# Patient Record
Sex: FEMALE | Race: WHITE | NOT HISPANIC OR LATINO | Employment: UNEMPLOYED | URBAN - NONMETROPOLITAN AREA
[De-identification: names, ages, dates, MRNs, and addresses within clinical notes are randomized per-mention and may not be internally consistent; named-entity substitution may affect disease eponyms.]

---

## 2018-11-26 ENCOUNTER — OFFICE VISIT (OUTPATIENT)
Dept: RETAIL CLINIC | Facility: CLINIC | Age: 73
End: 2018-11-26

## 2018-11-26 VITALS
OXYGEN SATURATION: 98 % | HEART RATE: 72 BPM | TEMPERATURE: 97.2 F | BODY MASS INDEX: 32.17 KG/M2 | HEIGHT: 66 IN | RESPIRATION RATE: 18 BRPM | WEIGHT: 200.2 LBS

## 2018-11-26 DIAGNOSIS — J01.00 SUBACUTE MAXILLARY SINUSITIS: Primary | ICD-10-CM

## 2018-11-26 PROCEDURE — 99203 OFFICE O/P NEW LOW 30 MIN: CPT | Performed by: NURSE PRACTITIONER

## 2018-11-26 RX ORDER — BUSPIRONE HYDROCHLORIDE 10 MG/1
10 TABLET ORAL 3 TIMES DAILY
COMMUNITY

## 2018-11-26 RX ORDER — CITALOPRAM 40 MG/1
40 TABLET ORAL DAILY
COMMUNITY

## 2018-11-26 RX ORDER — GABAPENTIN 300 MG/1
300 CAPSULE ORAL 3 TIMES DAILY PRN
COMMUNITY

## 2018-11-26 RX ORDER — FLUTICASONE PROPIONATE 50 MCG
2 SPRAY, SUSPENSION (ML) NASAL DAILY
Qty: 1 BOTTLE | Refills: 0 | Status: SHIPPED | OUTPATIENT
Start: 2018-11-26

## 2018-11-26 RX ORDER — LISINOPRIL AND HYDROCHLOROTHIAZIDE 25; 20 MG/1; MG/1
1 TABLET ORAL DAILY
COMMUNITY

## 2018-11-26 RX ORDER — ROPINIROLE 0.5 MG/1
0.5 TABLET, FILM COATED ORAL NIGHTLY
COMMUNITY

## 2018-11-26 RX ORDER — AMOXICILLIN AND CLAVULANATE POTASSIUM 875; 125 MG/1; MG/1
1 TABLET, FILM COATED ORAL 2 TIMES DAILY
Qty: 20 TABLET | Refills: 0 | Status: SHIPPED | OUTPATIENT
Start: 2018-11-26 | End: 2018-12-06

## 2018-11-26 RX ORDER — OMEPRAZOLE 20 MG/1
20 CAPSULE, DELAYED RELEASE ORAL DAILY
COMMUNITY

## 2018-11-26 RX ORDER — PRAVASTATIN SODIUM 40 MG
40 TABLET ORAL DAILY
COMMUNITY

## 2018-11-26 NOTE — PROGRESS NOTES
SHANELLE Reyes is a 73 y.o. female.   Chief Complaint   Patient presents with   • Sinusitis      Sinusitis   This is a recurrent problem. The current episode started in the past 7 days. The problem has been rapidly worsening since onset. There has been no fever. Associated symptoms include chills, congestion, coughing (nonproductive), ear pain (left ear congestion), headaches, sinus pressure and a sore throat. Pertinent negatives include no neck pain, shortness of breath, sneezing or swollen glands. Past treatments include nothing.      The following portions of the patient's history were reviewed and updated as appropriate: allergies, current medications, past family history, past medical history, past social history, past surgical history and problem list.    Current Outpatient Medications:   •  busPIRone (BUSPAR) 10 MG tablet, Take 10 mg by mouth 3 (Three) Times a Day., Disp: , Rfl:   •  citalopram (CeleXA) 40 MG tablet, Take 40 mg by mouth Daily., Disp: , Rfl:   •  gabapentin (NEURONTIN) 300 MG capsule, Take 300 mg by mouth 3 (Three) Times a Day As Needed., Disp: , Rfl:   •  lisinopril-hydrochlorothiazide (PRINZIDE,ZESTORETIC) 20-25 MG per tablet, Take 1 tablet by mouth Daily., Disp: , Rfl:   •  omeprazole (priLOSEC) 20 MG capsule, Take 20 mg by mouth Daily., Disp: , Rfl:   •  pravastatin (PRAVACHOL) 40 MG tablet, Take 40 mg by mouth Daily., Disp: , Rfl:   •  rOPINIRole (REQUIP) 0.5 MG tablet, Take 0.5 mg by mouth Every Night. Take 1 hour before bedtime., Disp: , Rfl:   •  amoxicillin-clavulanate (AUGMENTIN) 875-125 MG per tablet, Take 1 tablet by mouth 2 (Two) Times a Day for 10 days., Disp: 20 tablet, Rfl: 0  •  fluticasone (FLONASE) 50 MCG/ACT nasal spray, 2 sprays into the nostril(s) as directed by provider Daily., Disp: 1 bottle, Rfl: 0    No Known Allergies    Review of Systems   Constitutional: Positive for activity change and chills. Negative for fever.   HENT: Positive for congestion,  "ear pain (left ear congestion), sinus pressure and sore throat. Negative for sneezing.    Eyes: Negative for discharge and itching.   Respiratory: Positive for cough (nonproductive). Negative for shortness of breath and wheezing.    Cardiovascular: Negative for chest pain.   Gastrointestinal: Negative for abdominal distention, diarrhea, nausea and vomiting.   Musculoskeletal: Positive for myalgias. Negative for neck pain and neck stiffness.   Skin: Negative for color change, pallor and rash.   Allergic/Immunologic: Negative for immunocompromised state.   Neurological: Positive for headaches.   Psychiatric/Behavioral: Negative for sleep disturbance.     Objective     Visit Vitals  Pulse 72   Temp 97.2 °F (36.2 °C) (Temporal)   Resp 18   Ht 167.6 cm (66\")   Wt 90.8 kg (200 lb 3.2 oz)   SpO2 98%   BMI 32.31 kg/m²     Physical Exam   Constitutional: She is oriented to person, place, and time. She appears well-developed and well-nourished.   HENT:   Head: Normocephalic.   Right Ear: Tympanic membrane is bulging. Tympanic membrane is not erythematous.   Left Ear: Tympanic membrane is injected and bulging. Tympanic membrane is not perforated and not erythematous. A middle ear effusion is present.   Nose: Mucosal edema, rhinorrhea and sinus tenderness present. Right sinus exhibits maxillary sinus tenderness. Left sinus exhibits maxillary sinus tenderness.   Mouth/Throat: Posterior oropharyngeal erythema present. No posterior oropharyngeal edema.   Eyes: Conjunctivae are normal. Pupils are equal, round, and reactive to light.   Cardiovascular: Normal rate and regular rhythm.   Pulmonary/Chest: Effort normal and breath sounds normal. She has no wheezes.   Abdominal: Soft. Bowel sounds are normal. She exhibits no distension. There is no tenderness. There is no guarding.   Lymphadenopathy:     She has cervical adenopathy.   Neurological: She is alert and oriented to person, place, and time.   Skin: Skin is warm and dry. "   Psychiatric: She has a normal mood and affect. Her behavior is normal.     Lab Results (last 24 hours)     ** No results found for the last 24 hours. **        Assessment/Plan   Beulah was seen today for sinusitis.    Diagnoses and all orders for this visit:    Subacute maxillary sinusitis  -     fluticasone (FLONASE) 50 MCG/ACT nasal spray; 2 sprays into the nostril(s) as directed by provider Daily.  -     amoxicillin-clavulanate (AUGMENTIN) 875-125 MG per tablet; Take 1 tablet by mouth 2 (Two) Times a Day for 10 days.               Discussed PE findings and treatment plan. AVS reviewed with patient, understanding verbalized and agrees with treatment plan.  Follow up with primary care provider if no improvement within next several days. Go to [facility] or ER if condition worsens.

## 2018-11-26 NOTE — PATIENT INSTRUCTIONS

## 2019-05-18 ENCOUNTER — OFFICE VISIT (OUTPATIENT)
Dept: RETAIL CLINIC | Facility: CLINIC | Age: 74
End: 2019-05-18

## 2019-05-18 VITALS
DIASTOLIC BLOOD PRESSURE: 70 MMHG | HEIGHT: 66 IN | WEIGHT: 186 LBS | RESPIRATION RATE: 16 BRPM | OXYGEN SATURATION: 98 % | BODY MASS INDEX: 29.89 KG/M2 | TEMPERATURE: 98.7 F | SYSTOLIC BLOOD PRESSURE: 120 MMHG

## 2019-05-18 DIAGNOSIS — B34.9 VIRAL SYNDROME: Primary | ICD-10-CM

## 2019-05-18 PROCEDURE — 99213 OFFICE O/P EST LOW 20 MIN: CPT | Performed by: NURSE PRACTITIONER

## 2019-05-18 NOTE — PROGRESS NOTES
Subjective   Beulah Reyes is a 73 y.o. female.   Chief Complaint   Patient presents with   • Fatigue      Pt presents to the clinic today for evaluation.  Complains of not feeling well for the last several days.  No fever but has been chilling.  No cough, rhinorrhea or sore throat.  She feels tired and weak.  No specific urinary symptoms.  No chest discomfort or respiratory problems.  No specific ill contacts.  No recent tick bites.  No recent travel.        Fatigue   This is a new problem. The current episode started in the past 7 days. Associated symptoms include chills, fatigue, a visual change and weakness. Pertinent negatives include no abdominal pain, chest pain, congestion, coughing, fever, headaches, joint swelling, myalgias, nausea, rash, sore throat, urinary symptoms or vomiting.        The following portions of the patient's history were reviewed and updated as appropriate: allergies, current medications, past family history, past medical history, past social history, past surgical history and problem list.    Current Outpatient Medications:   •  busPIRone (BUSPAR) 10 MG tablet, Take 10 mg by mouth 3 (Three) Times a Day., Disp: , Rfl:   •  citalopram (CeleXA) 40 MG tablet, Take 40 mg by mouth Daily., Disp: , Rfl:   •  fluticasone (FLONASE) 50 MCG/ACT nasal spray, 2 sprays into the nostril(s) as directed by provider Daily., Disp: 1 bottle, Rfl: 0  •  gabapentin (NEURONTIN) 300 MG capsule, Take 300 mg by mouth 3 (Three) Times a Day As Needed., Disp: , Rfl:   •  lisinopril-hydrochlorothiazide (PRINZIDE,ZESTORETIC) 20-25 MG per tablet, Take 1 tablet by mouth Daily., Disp: , Rfl:   •  omeprazole (priLOSEC) 20 MG capsule, Take 20 mg by mouth Daily., Disp: , Rfl:   •  pravastatin (PRAVACHOL) 40 MG tablet, Take 40 mg by mouth Daily., Disp: , Rfl:   •  rOPINIRole (REQUIP) 0.5 MG tablet, Take 0.5 mg by mouth Every Night. Take 1 hour before bedtime., Disp: , Rfl:     Review of Systems   Constitutional: Positive  "for chills and fatigue. Negative for activity change, appetite change and fever.   HENT: Negative for congestion, drooling, ear discharge, ear pain, mouth sores, nosebleeds, postnasal drip, rhinorrhea, sneezing and sore throat.    Eyes: Negative for pain, discharge and visual disturbance.   Respiratory: Negative for cough, shortness of breath and wheezing.    Cardiovascular: Negative for chest pain, palpitations and leg swelling.   Gastrointestinal: Negative for abdominal pain, constipation, diarrhea, nausea and vomiting.   Musculoskeletal: Negative for joint swelling and myalgias.   Skin: Negative for rash and wound.   Neurological: Positive for weakness. Negative for headaches.     /70 (BP Location: Left arm, Patient Position: Sitting)   Temp 98.7 °F (37.1 °C) (Temporal)   Resp 16   Ht 167.6 cm (66\")   Wt 84.4 kg (186 lb)   SpO2 98%   BMI 30.02 kg/m²     Objective   No Known Allergies    Physical Exam   Constitutional: Vital signs are normal. She appears well-developed and well-nourished.   HENT:   Head: Normocephalic.   Right Ear: Tympanic membrane normal.   Left Ear: Tympanic membrane normal.   Nose: Nose normal.   Mouth/Throat: Uvula is midline, oropharynx is clear and moist and mucous membranes are normal.   Eyes: Conjunctivae and EOM are normal. Pupils are equal, round, and reactive to light.   Neck: Normal range of motion. Neck supple. No thyromegaly present.   Cardiovascular: Normal rate and regular rhythm.   Pulmonary/Chest: Effort normal and breath sounds normal.   Abdominal: Soft. Normal appearance and bowel sounds are normal. She exhibits no mass. There is no hepatosplenomegaly. There is no tenderness.   Musculoskeletal: Normal range of motion.   Neurological: She is alert.   Skin: Skin is warm and dry.   Psychiatric: She has a normal mood and affect. Her speech is normal and behavior is normal.       Assessment/Plan   Beulah was seen today for fatigue.    Diagnoses and all orders for this " visit:    Viral syndrome      Discussed with patient that there is nothing specific on her exam to treat today.  Encourage her to do a urinalysis however she wasn't able to urinate and didn't want to wait and give a sample.  I recommended that she go for further evaluation to include laboratory testing and imaging.  Discussed differentials and advised pt to seek further medical attention.  She agrees and expresses understanding.

## 2019-05-18 NOTE — PATIENT INSTRUCTIONS
Viral Illness, Adult  Viruses are tiny germs that can get into a person's body and cause illness. There are many different types of viruses, and they cause many types of illness. Viral illnesses can range from mild to severe. They can affect various parts of the body.  Common illnesses that are caused by a virus include colds and the flu. Viral illnesses also include serious conditions such as HIV/AIDS (human immunodeficiency virus/acquired immunodeficiency syndrome). A few viruses have been linked to certain cancers.  What are the causes?  Many types of viruses can cause illness. Viruses invade cells in your body, multiply, and cause the infected cells to malfunction or die. When the cell dies, it releases more of the virus. When this happens, you develop symptoms of the illness, and the virus continues to spread to other cells. If the virus takes over the function of the cell, it can cause the cell to divide and grow out of control, as is the case when a virus causes cancer.  Different viruses get into the body in different ways. You can get a virus by:  · Swallowing food or water that is contaminated with the virus.  · Breathing in droplets that have been coughed or sneezed into the air by an infected person.  · Touching a surface that has been contaminated with the virus and then touching your eyes, nose, or mouth.  · Being bitten by an insect or animal that carries the virus.  · Having sexual contact with a person who is infected with the virus.  · Being exposed to blood or fluids that contain the virus, either through an open cut or during a transfusion.    If a virus enters your body, your body's defense system (immune system) will try to fight the virus. You may be at higher risk for a viral illness if your immune system is weak.  What are the signs or symptoms?  Symptoms vary depending on the type of virus and the location of the cells that it invades. Common symptoms of the main types of viral illnesses  include:  Cold and flu viruses  · Fever.  · Headache.  · Sore throat.  · Muscle aches.  · Nasal congestion.  · Cough.  Digestive system (gastrointestinal) viruses  · Fever.  · Abdominal pain.  · Nausea.  · Diarrhea.  Liver viruses (hepatitis)  · Loss of appetite.  · Tiredness.  · Yellowing of the skin (jaundice).  Brain and spinal cord viruses  · Fever.  · Headache.  · Stiff neck.  · Nausea and vomiting.  · Confusion or sleepiness.  Skin viruses  · Warts.  · Itching.  · Rash.  Sexually transmitted viruses  · Discharge.  · Swelling.  · Redness.  · Rash.  How is this treated?  Viruses can be difficult to treat because they live within cells. Antibiotic medicines do not treat viruses because these drugs do not get inside cells. Treatment for a viral illness may include:  · Resting and drinking plenty of fluids.  · Medicines to relieve symptoms. These can include over-the-counter medicine for pain and fever, medicines for cough or congestion, and medicines to relieve diarrhea.  · Antiviral medicines. These drugs are available only for certain types of viruses. They may help reduce flu symptoms if taken early. There are also many antiviral medicines for hepatitis and HIV/AIDS.    Some viral illnesses can be prevented with vaccinations. A common example is the flu shot.  Follow these instructions at home:  Medicines    · Take over-the-counter and prescription medicines only as told by your health care provider.  · If you were prescribed an antiviral medicine, take it as told by your health care provider. Do not stop taking the medicine even if you start to feel better.  · Be aware of when antibiotics are needed and when they are not needed. Antibiotics do not treat viruses. If your health care provider thinks that you may have a bacterial infection as well as a viral infection, you may get an antibiotic.  ? Do not ask for an antibiotic prescription if you have been diagnosed with a viral illness. That will not make your  illness go away faster.  ? Frequently taking antibiotics when they are not needed can lead to antibiotic resistance. When this develops, the medicine no longer works against the bacteria that it normally fights.  General instructions  · Drink enough fluids to keep your urine clear or pale yellow.  · Rest as much as possible.  · Return to your normal activities as told by your health care provider. Ask your health care provider what activities are safe for you.  · Keep all follow-up visits as told by your health care provider. This is important.  How is this prevented?  Take these actions to reduce your risk of viral infection:  · Eat a healthy diet and get enough rest.  · Wash your hands often with soap and water. This is especially important when you are in public places. If soap and water are not available, use hand .  · Avoid close contact with friends and family who have a viral illness.  · If you travel to areas where viral gastrointestinal infection is common, avoid drinking water or eating raw food.  · Keep your immunizations up to date. Get a flu shot every year as told by your health care provider.  · Do not share toothbrushes, nail clippers, razors, or needles with other people.  · Always practice safe sex.    Contact a health care provider if:  · You have symptoms of a viral illness that do not go away.  · Your symptoms come back after going away.  · Your symptoms get worse.  Get help right away if:  · You have trouble breathing.  · You have a severe headache or a stiff neck.  · You have severe vomiting or abdominal pain.  This information is not intended to replace advice given to you by your health care provider. Make sure you discuss any questions you have with your health care provider.  Document Released: 04/28/2017 Document Revised: 05/31/2017 Document Reviewed: 04/28/2017  [employer] Patient Education © 2019 [employer]

## 2021-02-04 ENCOUNTER — IMMUNIZATION (OUTPATIENT)
Dept: VACCINE CLINIC | Facility: HOSPITAL | Age: 76
End: 2021-02-04

## 2021-02-04 PROCEDURE — 91300 HC SARSCOV02 VAC 30MCG/0.3ML IM: CPT | Performed by: INTERNAL MEDICINE

## 2021-02-04 PROCEDURE — 0001A: CPT | Performed by: INTERNAL MEDICINE

## 2021-02-25 ENCOUNTER — IMMUNIZATION (OUTPATIENT)
Dept: VACCINE CLINIC | Facility: HOSPITAL | Age: 76
End: 2021-02-25

## 2021-02-25 PROCEDURE — 91300 HC SARSCOV02 VAC 30MCG/0.3ML IM: CPT | Performed by: INTERNAL MEDICINE

## 2021-02-25 PROCEDURE — 0002A: CPT | Performed by: INTERNAL MEDICINE

## 2021-03-28 ENCOUNTER — HOSPITAL ENCOUNTER (EMERGENCY)
Dept: HOSPITAL 79 - ER1 | Age: 76
Discharge: HOME | End: 2021-03-28
Payer: MEDICARE

## 2021-03-28 DIAGNOSIS — E78.5: ICD-10-CM

## 2021-03-28 DIAGNOSIS — F41.9: ICD-10-CM

## 2021-03-28 DIAGNOSIS — E03.9: ICD-10-CM

## 2021-03-28 DIAGNOSIS — R06.00: ICD-10-CM

## 2021-03-28 DIAGNOSIS — K21.9: ICD-10-CM

## 2021-03-28 DIAGNOSIS — R07.9: Primary | ICD-10-CM

## 2021-03-28 LAB
BUN/CREATININE RATIO: 16 (ref 0–10)
HGB BLD-MCNC: 11.5 GM/DL (ref 12.3–15.3)
RED BLOOD COUNT: 3.92 M/UL (ref 4–5.1)
WHITE BLOOD COUNT: 5.2 K/UL (ref 4.5–11)

## 2021-08-26 ENCOUNTER — HOSPITAL ENCOUNTER (OUTPATIENT)
Dept: HOSPITAL 79 - MAMO | Age: 76
End: 2021-08-26
Attending: NURSE PRACTITIONER
Payer: MEDICARE

## 2021-08-26 DIAGNOSIS — Z12.31: Primary | ICD-10-CM

## 2021-11-30 ENCOUNTER — HOSPITAL ENCOUNTER (EMERGENCY)
Dept: HOSPITAL 79 - ER1 | Age: 76
LOS: 1 days | Discharge: HOME | End: 2021-12-01
Payer: MEDICARE

## 2021-11-30 DIAGNOSIS — E78.5: ICD-10-CM

## 2021-11-30 DIAGNOSIS — R07.9: Primary | ICD-10-CM

## 2021-11-30 DIAGNOSIS — I10: ICD-10-CM

## 2021-12-01 LAB
BUN/CREATININE RATIO: 10 (ref 0–10)
HGB BLD-MCNC: 12.4 GM/DL (ref 12.3–15.3)
RED BLOOD COUNT: 4.03 M/UL (ref 4–5.1)
WHITE BLOOD COUNT: 6.5 K/UL (ref 4.5–11)

## 2022-03-01 ENCOUNTER — HOSPITAL ENCOUNTER (INPATIENT)
Dept: HOSPITAL 79 - ER1 | Age: 77
Discharge: HOME | DRG: 313 | End: 2022-03-01
Attending: STUDENT IN AN ORGANIZED HEALTH CARE EDUCATION/TRAINING PROGRAM | Admitting: EMERGENCY MEDICINE
Payer: MEDICARE

## 2022-03-01 VITALS — BODY MASS INDEX: 29.99 KG/M2 | HEIGHT: 65 IN | WEIGHT: 180 LBS

## 2022-03-01 DIAGNOSIS — F32.9: ICD-10-CM

## 2022-03-01 DIAGNOSIS — Z98.890: ICD-10-CM

## 2022-03-01 DIAGNOSIS — K21.9: ICD-10-CM

## 2022-03-01 DIAGNOSIS — Z20.822: ICD-10-CM

## 2022-03-01 DIAGNOSIS — Z90.49: ICD-10-CM

## 2022-03-01 DIAGNOSIS — Z79.899: ICD-10-CM

## 2022-03-01 DIAGNOSIS — R07.89: Primary | ICD-10-CM

## 2022-03-01 DIAGNOSIS — I10: ICD-10-CM

## 2022-03-01 DIAGNOSIS — Z82.49: ICD-10-CM

## 2022-03-01 DIAGNOSIS — K44.9: ICD-10-CM

## 2022-03-01 DIAGNOSIS — E78.5: ICD-10-CM

## 2022-03-01 DIAGNOSIS — Z79.82: ICD-10-CM

## 2022-03-01 DIAGNOSIS — F41.9: ICD-10-CM

## 2022-03-01 LAB
BUN/CREATININE RATIO: 24 (ref 0–10)
HGB BLD-MCNC: 12.6 GM/DL (ref 12.3–15.3)
RED BLOOD COUNT: 4.06 M/UL (ref 4–5.1)
WHITE BLOOD COUNT: 5.5 K/UL (ref 4.5–11)

## 2022-03-01 PROCEDURE — B24BZZZ ULTRASONOGRAPHY OF HEART WITH AORTA: ICD-10-PCS | Performed by: INTERNAL MEDICINE

## 2022-03-01 PROCEDURE — U0002 COVID-19 LAB TEST NON-CDC: HCPCS

## 2022-03-01 PROCEDURE — A9502 TC99M TETROFOSMIN: HCPCS

## 2022-09-07 ENCOUNTER — HOSPITAL ENCOUNTER (OUTPATIENT)
Dept: HOSPITAL 79 - MAMO | Age: 77
End: 2022-09-07
Attending: NURSE PRACTITIONER
Payer: MEDICARE

## 2022-09-07 DIAGNOSIS — Z12.31: Primary | ICD-10-CM

## 2023-06-13 ENCOUNTER — TRANSCRIBE ORDERS (OUTPATIENT)
Dept: ADMINISTRATIVE | Facility: HOSPITAL | Age: 78
End: 2023-06-13
Payer: MEDICARE

## 2023-06-13 DIAGNOSIS — R51.9 NONINTRACTABLE HEADACHE, UNSPECIFIED CHRONICITY PATTERN, UNSPECIFIED HEADACHE TYPE: Primary | ICD-10-CM

## 2025-02-24 ENCOUNTER — TRANSCRIBE ORDERS (OUTPATIENT)
Dept: ADMINISTRATIVE | Facility: HOSPITAL | Age: 80
End: 2025-02-24
Payer: MEDICARE

## 2025-02-24 ENCOUNTER — HOSPITAL ENCOUNTER (OUTPATIENT)
Dept: GENERAL RADIOLOGY | Facility: HOSPITAL | Age: 80
Discharge: HOME | End: 2025-02-24
Payer: MEDICARE

## 2025-02-24 DIAGNOSIS — M54.50 LUMBAR PAIN: ICD-10-CM

## 2025-02-24 DIAGNOSIS — M54.50 LUMBAR PAIN: Primary | ICD-10-CM

## 2025-02-24 PROCEDURE — 72110 X-RAY EXAM L-2 SPINE 4/>VWS: CPT | Performed by: RADIOLOGY

## 2025-02-24 PROCEDURE — 72110 X-RAY EXAM L-2 SPINE 4/>VWS: CPT
